# Patient Record
Sex: FEMALE | Race: WHITE | Employment: OTHER | URBAN - METROPOLITAN AREA
[De-identification: names, ages, dates, MRNs, and addresses within clinical notes are randomized per-mention and may not be internally consistent; named-entity substitution may affect disease eponyms.]

---

## 2017-11-14 ENCOUNTER — HOSPITAL ENCOUNTER (OUTPATIENT)
Dept: CT IMAGING | Age: 27
Discharge: HOME | End: 2017-11-14
Attending: FAMILY MEDICINE
Payer: COMMERCIAL

## 2017-11-14 DIAGNOSIS — R10.9 ABDOMINAL PAIN: ICD-10-CM

## 2017-11-14 PROCEDURE — 74177 CT ABD & PELVIS W/CONTRAST: CPT

## 2017-11-14 PROCEDURE — 74011636320 HC RX REV CODE- 636/320: Performed by: FAMILY MEDICINE

## 2017-11-14 PROCEDURE — 74011000258 HC RX REV CODE- 258: Performed by: FAMILY MEDICINE

## 2017-11-14 RX ORDER — SODIUM CHLORIDE 0.9 % (FLUSH) 0.9 %
10 SYRINGE (ML) INJECTION
Status: COMPLETED | OUTPATIENT
Start: 2017-11-14 | End: 2017-11-14

## 2017-11-14 RX ADMIN — SODIUM CHLORIDE 100 ML: 900 INJECTION, SOLUTION INTRAVENOUS at 11:47

## 2017-11-14 RX ADMIN — Medication 10 ML: at 11:47

## 2017-11-14 RX ADMIN — IOHEXOL 50 ML: 240 INJECTION, SOLUTION INTRATHECAL; INTRAVASCULAR; INTRAVENOUS; ORAL at 11:48

## 2017-11-14 RX ADMIN — IOPAMIDOL 100 ML: 755 INJECTION, SOLUTION INTRAVENOUS at 11:47

## 2021-01-21 ENCOUNTER — HOSPITAL ENCOUNTER (EMERGENCY)
Age: 31
Discharge: HOME | End: 2021-01-21
Attending: EMERGENCY MEDICINE
Payer: COMMERCIAL

## 2021-01-21 ENCOUNTER — OFFICE VISIT (OUTPATIENT)
Dept: CARDIOLOGY CLINIC | Age: 31
End: 2021-01-21
Payer: COMMERCIAL

## 2021-01-21 ENCOUNTER — APPOINTMENT (OUTPATIENT)
Dept: CT IMAGING | Age: 31
End: 2021-01-21
Attending: EMERGENCY MEDICINE
Payer: COMMERCIAL

## 2021-01-21 ENCOUNTER — CLINICAL SUPPORT (OUTPATIENT)
Dept: CARDIOLOGY CLINIC | Age: 31
End: 2021-01-21
Payer: COMMERCIAL

## 2021-01-21 VITALS
BODY MASS INDEX: 36.64 KG/M2 | WEIGHT: 228 LBS | RESPIRATION RATE: 18 BRPM | HEIGHT: 66 IN | DIASTOLIC BLOOD PRESSURE: 84 MMHG | HEART RATE: 95 BPM | SYSTOLIC BLOOD PRESSURE: 118 MMHG | OXYGEN SATURATION: 98 %

## 2021-01-21 VITALS
SYSTOLIC BLOOD PRESSURE: 118 MMHG | RESPIRATION RATE: 16 BRPM | TEMPERATURE: 98 F | OXYGEN SATURATION: 100 % | DIASTOLIC BLOOD PRESSURE: 78 MMHG | HEART RATE: 86 BPM

## 2021-01-21 VITALS
OXYGEN SATURATION: 98 % | RESPIRATION RATE: 18 BRPM | TEMPERATURE: 97.7 F | DIASTOLIC BLOOD PRESSURE: 90 MMHG | SYSTOLIC BLOOD PRESSURE: 146 MMHG | HEART RATE: 90 BPM

## 2021-01-21 DIAGNOSIS — R00.2 PALPITATION: ICD-10-CM

## 2021-01-21 DIAGNOSIS — R00.2 PALPITATIONS: Primary | ICD-10-CM

## 2021-01-21 DIAGNOSIS — R25.3 TWITCHING: ICD-10-CM

## 2021-01-21 DIAGNOSIS — F41.1 ANXIETY STATE: Primary | ICD-10-CM

## 2021-01-21 DIAGNOSIS — R13.19 OTHER DYSPHAGIA: ICD-10-CM

## 2021-01-21 DIAGNOSIS — R00.2 PALPITATION: Primary | ICD-10-CM

## 2021-01-21 LAB
ALBUMIN SERPL-MCNC: 4 G/DL (ref 3.5–5)
ALBUMIN/GLOB SERPL: 1 {RATIO} (ref 1.1–2.2)
ALP SERPL-CCNC: 87 U/L (ref 45–117)
ALT SERPL-CCNC: 23 U/L (ref 12–78)
ANION GAP SERPL CALC-SCNC: 9 MMOL/L (ref 5–15)
AST SERPL-CCNC: 10 U/L (ref 15–37)
BASOPHILS # BLD: 0.1 K/UL (ref 0–0.1)
BASOPHILS NFR BLD: 1 % (ref 0–1)
BILIRUB SERPL-MCNC: 1.1 MG/DL (ref 0.2–1)
BUN SERPL-MCNC: 6 MG/DL (ref 6–20)
BUN/CREAT SERPL: 7 (ref 12–20)
CALCIUM SERPL-MCNC: 9.7 MG/DL (ref 8.5–10.1)
CHLORIDE SERPL-SCNC: 105 MMOL/L (ref 97–108)
CO2 SERPL-SCNC: 23 MMOL/L (ref 21–32)
COMMENT, HOLDF: NORMAL
CREAT SERPL-MCNC: 0.84 MG/DL (ref 0.55–1.02)
DIFFERENTIAL METHOD BLD: NORMAL
EOSINOPHIL # BLD: 0.1 K/UL (ref 0–0.4)
EOSINOPHIL NFR BLD: 1 % (ref 0–7)
ERYTHROCYTE [DISTWIDTH] IN BLOOD BY AUTOMATED COUNT: 11.9 % (ref 11.5–14.5)
GLOBULIN SER CALC-MCNC: 3.9 G/DL (ref 2–4)
GLUCOSE SERPL-MCNC: 104 MG/DL (ref 65–100)
HCG UR QL: NEGATIVE
HCT VFR BLD AUTO: 42.6 % (ref 35–47)
HGB BLD-MCNC: 14.7 G/DL (ref 11.5–16)
IMM GRANULOCYTES # BLD AUTO: 0 K/UL (ref 0–0.04)
IMM GRANULOCYTES NFR BLD AUTO: 0 % (ref 0–0.5)
LYMPHOCYTES # BLD: 1.7 K/UL (ref 0.8–3.5)
LYMPHOCYTES NFR BLD: 17 % (ref 12–49)
MAGNESIUM SERPL-MCNC: 2.2 MG/DL (ref 1.6–2.4)
MCH RBC QN AUTO: 30.9 PG (ref 26–34)
MCHC RBC AUTO-ENTMCNC: 34.5 G/DL (ref 30–36.5)
MCV RBC AUTO: 89.5 FL (ref 80–99)
MONOCYTES # BLD: 0.6 K/UL (ref 0–1)
MONOCYTES NFR BLD: 6 % (ref 5–13)
NEUTS SEG # BLD: 7.5 K/UL (ref 1.8–8)
NEUTS SEG NFR BLD: 75 % (ref 32–75)
NRBC # BLD: 0 K/UL (ref 0–0.01)
NRBC BLD-RTO: 0 PER 100 WBC
PHOSPHATE SERPL-MCNC: 2.5 MG/DL (ref 2.6–4.7)
PLATELET # BLD AUTO: 322 K/UL (ref 150–400)
PMV BLD AUTO: 9.5 FL (ref 8.9–12.9)
POTASSIUM SERPL-SCNC: 3.6 MMOL/L (ref 3.5–5.1)
PROT SERPL-MCNC: 7.9 G/DL (ref 6.4–8.2)
RBC # BLD AUTO: 4.76 M/UL (ref 3.8–5.2)
SAMPLES BEING HELD,HOLD: NORMAL
SODIUM SERPL-SCNC: 137 MMOL/L (ref 136–145)
WBC # BLD AUTO: 10 K/UL (ref 3.6–11)

## 2021-01-21 PROCEDURE — 90791 PSYCH DIAGNOSTIC EVALUATION: CPT

## 2021-01-21 PROCEDURE — 93005 ELECTROCARDIOGRAM TRACING: CPT

## 2021-01-21 PROCEDURE — 99284 EMERGENCY DEPT VISIT MOD MDM: CPT

## 2021-01-21 PROCEDURE — 84100 ASSAY OF PHOSPHORUS: CPT

## 2021-01-21 PROCEDURE — 80053 COMPREHEN METABOLIC PANEL: CPT

## 2021-01-21 PROCEDURE — 85025 COMPLETE CBC W/AUTO DIFF WBC: CPT

## 2021-01-21 PROCEDURE — 36415 COLL VENOUS BLD VENIPUNCTURE: CPT

## 2021-01-21 PROCEDURE — 81025 URINE PREGNANCY TEST: CPT

## 2021-01-21 PROCEDURE — 70450 CT HEAD/BRAIN W/O DYE: CPT

## 2021-01-21 PROCEDURE — 93227 XTRNL ECG REC<48 HR R&I: CPT | Performed by: INTERNAL MEDICINE

## 2021-01-21 PROCEDURE — 99204 OFFICE O/P NEW MOD 45 MIN: CPT | Performed by: SPECIALIST

## 2021-01-21 PROCEDURE — 83735 ASSAY OF MAGNESIUM: CPT

## 2021-01-21 PROCEDURE — 93225 XTRNL ECG REC<48 HRS REC: CPT | Performed by: SPECIALIST

## 2021-01-21 PROCEDURE — 96372 THER/PROPH/DIAG INJ SC/IM: CPT

## 2021-01-21 PROCEDURE — 74011250636 HC RX REV CODE- 250/636: Performed by: NURSE PRACTITIONER

## 2021-01-21 RX ORDER — HYDROXYZINE 50 MG/1
50 TABLET, FILM COATED ORAL
Qty: 30 TAB | Refills: 0 | Status: SHIPPED | OUTPATIENT
Start: 2021-01-21 | End: 2021-01-31

## 2021-01-21 RX ORDER — HYDROXYZINE 50 MG/ML
50 INJECTION, SOLUTION INTRAMUSCULAR
Status: COMPLETED | OUTPATIENT
Start: 2021-01-21 | End: 2021-01-21

## 2021-01-21 RX ADMIN — HYDROXYZINE HYDROCHLORIDE 50 MG: 50 INJECTION, SOLUTION INTRAMUSCULAR at 18:18

## 2021-01-21 NOTE — ED PROVIDER NOTES
HPI       Healthy 31y F here with muscle spasms/twitching all over. Started about 8 days ago. No injury or trauma. No hx of similar. Says the feeling like like \"just before getting a tavares horse. \" No fever. No rash or skin changes. No vomiting or diarrhea. [facility] Given 2mg valium and this made her sleepy. Also made her feel nauseated. Also reports heart feels like it is racing with some of these episodes. No shortness of breath. Nothing makes sx's better or worse. Also reports trouble swallowing and having endoscopy soon. Has tried to make all of her diet liquid but reports decreased caloric intake. Also stopped drinking etoh 10 days ago. Past Medical History:   Diagnosis Date    Diabetes mellitus     gestational diabetes--diet controlled, fasting and 1hr after meals    Psychiatric problem     anxiety--does not take medications    Rhesus isoimmunization unspecified as to episode of care in pregnancy     received rhogam shot       Past Surgical History:   Procedure Laterality Date    HX OTHER SURGICAL      T&A at age 1         History reviewed. No pertinent family history.     Social History     Socioeconomic History    Marital status:      Spouse name: Not on file    Number of children: Not on file    Years of education: Not on file    Highest education level: Not on file   Occupational History    Not on file   Social Needs    Financial resource strain: Not on file    Food insecurity     Worry: Not on file     Inability: Not on file    Transportation needs     Medical: Not on file     Non-medical: Not on file   Tobacco Use    Smoking status: Former Smoker     Packs/day: 0.25     Years: 4.00     Pack years: 1.00     Quit date: 8/15/2010     Years since quitting: 10.4    Smokeless tobacco: Never Used   Substance and Sexual Activity    Alcohol use: Not Currently     Comment: quit ten days ago     Drug use: No    Sexual activity: Yes     Partners: Male Lifestyle    Physical activity     Days per week: Not on file     Minutes per session: Not on file    Stress: Not on file   Relationships    Social connections     Talks on phone: Not on file     Gets together: Not on file     Attends Yazidi service: Not on file     Active member of club or organization: Not on file     Attends meetings of clubs or organizations: Not on file     Relationship status: Not on file    Intimate partner violence     Fear of current or ex partner: Not on file     Emotionally abused: Not on file     Physically abused: Not on file     Forced sexual activity: Not on file   Other Topics Concern    Not on file   Social History Narrative    Not on file         ALLERGIES: Azithromycin and Erythromycin    Review of Systems   Review of Systems   Constitutional: (-) weight loss. HEENT: (-) stiff neck   Eyes: (-) discharge. Respiratory: (-) cough. Cardiovascular: (-) syncope. Gastrointestinal: (-) blood in stool. Genitourinary: (-) hematuria. Musculoskeletal: (-) myalgias. Neurological: (-) seizure. Skin: (-) petechiae  Lymph/Immunologic: (-) enlarged lymph nodes  All other systems reviewed and are negative. Vitals:    01/21/21 0716   BP: (!) 146/90   Pulse: 90   Resp: 18   Temp: 97.7 °F (36.5 °C)   SpO2: 98%            Physical Exam Nursing note and vitals reviewed. Constitutional: oriented to person, place, and time. appears well-developed and well-nourished. No distress. Head: Normocephalic and atraumatic. Sclera anicteric  Nose: No rhinorrhea  Mouth/Throat: Oropharynx is clear and moist. Pharynx normal  Eyes: Conjunctivae are normal. Pupils are equal, round, and reactive to light. Right eye exhibits no discharge. Left eye exhibits no discharge. Neck: Painless normal range of motion. Neck supple. No LAD. Cardiovascular: Normal rate, regular rhythm, normal heart sounds and intact distal pulses. Exam reveals no gallop and no friction rub.   No murmur heard.  Pulmonary/Chest:  No respiratory distress. No wheezes. No rales. No rhonchi. No increased work of breathing. No accessory muscle use. Good air exchange throughout. Abdominal: soft, non-tender, no rebound or guarding. No hepatosplenomegaly. Normal bowel sounds throughout. Back: no tenderness to palpation, no deformities, no CVA tenderness  Extremities/Musculoskeletal: Normal range of motion. no tenderness. No edema. Distal extremities are neurovasc intact. Lymphadenopathy:   No adenopathy. Neurological:  CN II-XII intact, 5/5 strength throughout, nl sensation throughout, nl cerebellar function, nl speech/fluency, nl gait/station, no pronator drift. Normal mental status. Skin: Skin is warm and dry. No rash noted. No pallor. MDM 31y F here with muscle twitching and heart palpitations. Ongoing for about a week. Reassuring exam. Labs, ECG, and CT head. Procedures      ED EKG interpretation:  Rhythm: normal sinus rhythm; and regular . Rate (approx.): 91; Axis: normal; P wave: normal; QRS interval: normal ; ST/T wave: normal;  This EKG was interpreted by Syd Robles MD,ED Provider. 8:51 AM  Workup in the ED does not reveal any abnormalities. Pt aware of results and need to follow-up with neurology and cardiology for further evaluation of her sx's. Advised to return if she feels worse in any way.

## 2021-01-21 NOTE — ED TRIAGE NOTES
Triage:  Pt returned to ED due to concerns over intermittent periods of palpitations, and near syncopal moments. Pt arrives with holter monitor in place.

## 2021-01-21 NOTE — ED PROVIDER NOTES
This is a 33-year-old female with past medical history of anxiety and gestational diabetes who presents ER today for her third evaluation of episodic palpitations, impending sense of doom, anxiety, and sensation of dyspnea. According the patient, her symptoms started approximately 10 days ago which follows line with when she stopped using alcohol (was a former every day drinker). Since then, she reports increased anxiety and experiencing paroxysmal episodes of the above symptoms with no clear inciting event. She denies any history of discrete panic attacks, but states that she has had bad anxiety in the past.  She was seen and evaluated in this ER earlier today with a negative cardiac work-up and further cardiology, who implanted a loop recorder earlier today. She presents now in tears because she feels like she is going to die. ROS negative for: Family history of sudden cardiac death or premature heart disease, syncope, seizures, sensorimotor disturbances, vomiting, cough, fever/chills. Past Medical History:   Diagnosis Date    Diabetes mellitus     gestational diabetes--diet controlled, fasting and 1hr after meals    Psychiatric problem     anxiety--does not take medications    Rhesus isoimmunization unspecified as to episode of care in pregnancy     received rhogam shot       Past Surgical History:   Procedure Laterality Date    HX OTHER SURGICAL      T&A at age 1         History reviewed. No pertinent family history.     Social History     Socioeconomic History    Marital status:      Spouse name: Not on file    Number of children: Not on file    Years of education: Not on file    Highest education level: Not on file   Occupational History    Not on file   Social Needs    Financial resource strain: Not on file    Food insecurity     Worry: Not on file     Inability: Not on file    Transportation needs     Medical: Not on file     Non-medical: Not on file   Tobacco Use    Smoking status: Former Smoker     Packs/day: 0.25     Years: 4.00     Pack years: 1.00     Quit date: 8/15/2010     Years since quitting: 10.4    Smokeless tobacco: Never Used   Substance and Sexual Activity    Alcohol use: Not Currently     Comment: quit ten days ago     Drug use: No    Sexual activity: Yes     Partners: Male   Lifestyle    Physical activity     Days per week: Not on file     Minutes per session: Not on file    Stress: Not on file   Relationships    Social connections     Talks on phone: Not on file     Gets together: Not on file     Attends Sikh service: Not on file     Active member of club or organization: Not on file     Attends meetings of clubs or organizations: Not on file     Relationship status: Not on file    Intimate partner violence     Fear of current or ex partner: Not on file     Emotionally abused: Not on file     Physically abused: Not on file     Forced sexual activity: Not on file   Other Topics Concern    Not on file   Social History Narrative    Not on file         ALLERGIES: Azithromycin and Erythromycin    Review of Systems   Constitutional: Negative for fever. HENT: Negative for sore throat. Eyes: Negative for visual disturbance. Respiratory: Negative for shortness of breath. Cardiovascular: Positive for palpitations. Gastrointestinal: Negative for vomiting. Genitourinary: Negative for dysuria. Musculoskeletal: Negative for myalgias. Skin: Negative for rash. Neurological: Negative for dizziness. Psychiatric/Behavioral: Positive for dysphoric mood and sleep disturbance. Negative for self-injury and suicidal ideas. The patient is nervous/anxious. Vitals:    01/21/21 1701   BP: (!) 132/90   Pulse: 83   Resp: 18   Temp: 97.6 °F (36.4 °C)   SpO2: 99%            Physical Exam  Vitals signs and nursing note reviewed. Constitutional:       General: She is not in acute distress. Appearance: Normal appearance. She is not ill-appearing. HENT:      Head: Normocephalic and atraumatic. Right Ear: External ear normal.      Left Ear: External ear normal.      Nose: Nose normal.      Mouth/Throat:      Mouth: Mucous membranes are moist.      Pharynx: Oropharynx is clear. Eyes:      General: No scleral icterus. Extraocular Movements: Extraocular movements intact. Conjunctiva/sclera: Conjunctivae normal.      Pupils: Pupils are equal, round, and reactive to light. Neck:      Musculoskeletal: Normal range of motion and neck supple. No neck rigidity or muscular tenderness. Cardiovascular:      Rate and Rhythm: Normal rate and regular rhythm. Pulses: Normal pulses. Heart sounds: Normal heart sounds. Pulmonary:      Effort: Pulmonary effort is normal.      Breath sounds: Normal breath sounds. Abdominal:      General: Abdomen is flat. Bowel sounds are normal.      Palpations: Abdomen is soft. Musculoskeletal: Normal range of motion. Skin:     General: Skin is warm and dry. Coloration: Skin is not pale. Neurological:      General: No focal deficit present. Mental Status: She is alert and oriented to person, place, and time. Psychiatric:         Mood and Affect: Mood is anxious. Behavior: Behavior normal.         Thought Content: Thought content normal.         Judgment: Judgment normal.          MDM      VITAL SIGNS:  Patient Vitals for the past 4 hrs:   Temp Pulse Resp BP SpO2   01/21/21 1916 98 °F (36.7 °C) 86 16 118/78 100 %   01/21/21 1701 97.6 °F (36.4 °C) 83 18 (!) 132/90 99 %         LABS:  No results found for this or any previous visit (from the past 6 hour(s)). IMAGING:  No orders to display         Medications During Visit:  Medications   hydrOXYzine (VISTARIL) injection 50 mg (50 mg IntraMUSCular Given 1/21/21 1818)         DECISION MAKING:  Flavia Jacques is a 26 y.o. female who comes in as above. Chart reviewed from ER visit earlier today.   No new testing required due to ongoing symptoms that have not changed/progressed. Patient saw Dr. Jacinto Jansen earlier today and is currently wearing loop recorder. To me, symptoms were consistent with an anxiety disorder with panic attack features. Patient reports feeling much more calm and relaxed after one-time dose of hydroxyzine. BSMART was able to talk the patient provide outpatient CBT and counseling follow-up. Patient started take hydroxyzine as needed and to follow-up with her outpatient care team as discussed. Above results discussed and reviewed with the patient. Patient verbalized understanding of the care plan, including any changes to current outpatient medication regimen, discussed disease process, symptom control, and follow-up care. IMPRESSION:  1. Anxiety state        DISPOSITION:  Discharged      Current Discharge Medication List      START taking these medications    Details   hydrOXYzine HCL (ATARAX) 50 mg tablet Take 1 Tab by mouth every six (6) hours as needed for Anxiety for up to 10 days. Qty: 30 Tab, Refills: 0              Follow-up Information     Follow up With Specialties Details Why Contact Info    [facility] [place]  Call in 1 day to establish psychotherapy and medication management for panic disorder [phone]            The patient is asked to follow-up with their primary care provider in the next several days. They are to call tomorrow for an appointment. The patient is asked to return promptly for any increased concerns or worsening of symptoms. They can return to this emergency department or any other emergency department.

## 2021-01-21 NOTE — ED TRIAGE NOTES
Patient arrives via EMS from home with CC of muscle twitching and pain. Patient was seen at [facility] last night and was given Valium and was told to follow up with Neurology. Pt reports she was still twitching and had pain throughout the night, worse in the left shoulder and leg. Patient states she began to feel like her chest was pounding in the ambulance. Per EMS patient had an irregular rhythm.  +nausea. Also reports she quit drinking 10 days ago and has had poor PO intake since.

## 2021-01-21 NOTE — PROGRESS NOTES
HISTORY OF PRESENT ILLNESS  Parvin Levi is a 26 y.o. female. She is referred for evaluation of palpitations. They have been going on for quite some time but are worse now and she is also having a racing heart. The racing heart appeared about 4 months ago. She is under a great deal of stress staying at home as a mother caring for her children. She was drinking 1 bottle of wine per night but stopped about 10 days ago. She is also had trouble swallowing for the last 10 days and is having to use a liquid or soft diet. She had gained 80 pounds over the past year but now has lost 15 pounds. She has an appointment with a gastroenterologist for upper endoscopy. She does not smoke cigarettes or drink excessive caffeine. HPI  Patient Active Problem List   Diagnosis Code    Delivery normal O80    Gestational diabetes O24.419     Current Outpatient Medications   Medication Sig Dispense Refill    diazepam (VALIUM) 5 mg tablet Take 1 Tab by mouth every eight (8) hours as needed for Anxiety (do not drive while taking this medication). 10 Tab 0    traMADol (ULTRAM) 50 mg tablet Take 1 Tab by mouth every six (6) hours as needed for Pain. 12 Tab 0     Past Medical History:   Diagnosis Date    Diabetes mellitus     gestational diabetes--diet controlled, fasting and 1hr after meals    Psychiatric problem     anxiety--does not take medications    Rhesus isoimmunization unspecified as to episode of care in pregnancy     received rhogam shot     Past Surgical History:   Procedure Laterality Date    HX OTHER SURGICAL      T&A at age 1       Review of Systems   Constitutional: Positive for weight loss. Cardiovascular: Positive for palpitations. Gastrointestinal: Positive for nausea. Psychiatric/Behavioral: The patient is nervous/anxious. All other systems reviewed and are negative.     Visit Vitals  /84 (BP 1 Location: Left arm, BP Patient Position: Sitting)   Pulse 95   Resp 18   Ht 5' 6\" (1.676 m)   Wt 228 lb (103.4 kg)   SpO2 98%   BMI 36.80 kg/m²       Physical Exam   Constitutional: She is oriented to person, place, and time. She appears well-nourished. HENT:   Head: Atraumatic. Eyes: Conjunctivae are normal.   Neck: Neck supple. Cardiovascular: Normal rate, regular rhythm and normal heart sounds. Exam reveals no gallop and no friction rub. No murmur heard. Pulmonary/Chest: Breath sounds normal. She has no wheezes. Abdominal: Bowel sounds are normal.   Musculoskeletal:         General: No edema. Neurological: She is oriented to person, place, and time. Skin: Skin is dry. Psychiatric: Her behavior is normal.   Nursing note and vitals reviewed. ASSESSMENT and PLAN  It is unclear if she is having a primary arrhythmia or is having extreme anxiety and then sinus tachycardia. Her exam and EKG are normal.  I will order a 48-hour Holter monitor and have her return for an echocardiogram and see her afterwards. Contraindicated

## 2021-01-21 NOTE — PROGRESS NOTES
Andreas Montgomery is a 26 y.o. female  Chief Complaint   Patient presents with   [facility] Follow Up     From [address] [facility] [address] Maintenance Due   Topic Date Due    COVID-19 Vaccine (1 of 2) 05/16/2004    PAP AKA CERVICAL CYTOLOGY  05/16/2009    Flu Vaccine (1) 09/01/2020     Visit Vitals  /84 (BP 1 Location: Left arm, BP Patient Position: Sitting)   Pulse 95   Resp 18   Ht 5' 6\" (1.676 m)   Wt 228 lb (103.4 kg)   SpO2 98%   BMI 36.80 kg/m²

## 2021-01-22 LAB
ATRIAL RATE: 91 BPM
CALCULATED P AXIS, ECG09: 62 DEGREES
CALCULATED R AXIS, ECG10: 37 DEGREES
CALCULATED T AXIS, ECG11: 49 DEGREES
DIAGNOSIS, 93000: NORMAL
P-R INTERVAL, ECG05: 114 MS
Q-T INTERVAL, ECG07: 354 MS
QRS DURATION, ECG06: 88 MS
QTC CALCULATION (BEZET), ECG08: 435 MS
VENTRICULAR RATE, ECG03: 91 BPM

## 2021-01-22 NOTE — DISCHARGE INSTRUCTIONS

## 2021-01-22 NOTE — BSMART NOTE
The writer was asked by RITIKA Whalen to speak with the patient about outpatient resources to help manage panic attacks. The option of same-day access with [facility] as well as private practice was discussed. The patient plans to seek care with [facility], which offers relatively quick access to both psychotherapy and medication management. Once she is acutely stabilized here in the ED, she will be discharged with plan to follow up online at [facility] or by calling [phone].

## 2021-01-27 ENCOUNTER — TELEPHONE (OUTPATIENT)
Dept: CARDIOLOGY CLINIC | Age: 31
End: 2021-01-27

## 2021-01-27 NOTE — TELEPHONE ENCOUNTER
Dr. Davenport-  Patient's holter monitor results are on your desk for review. She is scheduled for an echo and to see you in office next Thursday 2/4.    Future Appointments   Date Time Provider Department Center   2/4/2021  3:00 PM [facility], [facility]   2/4/2021  3:40 PM Faustino Davenport MD [facility]   2/11/2021  8:00 AM Mary Phillips MD [facility]

## 2021-01-28 NOTE — TELEPHONE ENCOUNTER
Called patient. Verified patient's identity with two identifiers. Notified patient of results and Dr. Ana Myers message. She will keep appointments for testing and to see Dr. Peder Holter next week. Patient verbalized understanding and denied further questions or concerns.

## 2021-01-28 NOTE — TELEPHONE ENCOUNTER
MD Kortney Smith, RN   Caller: Unspecified (Yesterday,  2:42 PM)             Occasional premature beats, nothing concerning

## 2023-05-14 RX ORDER — DIAZEPAM 5 MG/1
TABLET ORAL EVERY 8 HOURS PRN
COMMUNITY
Start: 2014-05-05

## 2023-05-14 RX ORDER — TRAMADOL HYDROCHLORIDE 50 MG/1
TABLET ORAL EVERY 6 HOURS PRN
COMMUNITY
Start: 2014-05-05

## 2024-09-06 ENCOUNTER — TELEPHONE (OUTPATIENT)
Dept: PRIMARY CARE CLINIC | Facility: CLINIC | Age: 34
End: 2024-09-06

## 2024-09-06 NOTE — TELEPHONE ENCOUNTER
----- Message from Sunday GUTIERREZ sent at 9/6/2024  8:41 AM EDT -----  Regarding: [facility] Appointment Request  [facility] Appointment Request    Patient needs appointment for [facility] Appointment Type: New Patient.    Patient Requested Dates(s):Any date  Patient Requested Time:Afternoon  Provider Name:Lanie Brizuela DO    Reason for Appointment Request: New Patient - Requested Provider unavailable  --------------------------------------------------------------------------------------------------------------------------    Relationship to Patient: Self     Call Back Information: OK to leave message on voicemail  Preferred Call Back Number: Phone [phone]

## 2025-07-16 ENCOUNTER — HOSPITAL ENCOUNTER (EMERGENCY)
Facility: HOSPITAL | Age: 35
Discharge: HOME | End: 2025-07-16
Attending: EMERGENCY MEDICINE
Payer: COMMERCIAL

## 2025-07-16 ENCOUNTER — APPOINTMENT (OUTPATIENT)
Facility: HOSPITAL | Age: 35
End: 2025-07-16
Payer: COMMERCIAL

## 2025-07-16 VITALS
DIASTOLIC BLOOD PRESSURE: 78 MMHG | HEART RATE: 79 BPM | SYSTOLIC BLOOD PRESSURE: 158 MMHG | TEMPERATURE: 97.7 F | RESPIRATION RATE: 16 BRPM | OXYGEN SATURATION: 100 %

## 2025-07-16 DIAGNOSIS — S61.215A LACERATION OF LEFT RING FINGER WITHOUT FOREIGN BODY WITHOUT DAMAGE TO NAIL, INITIAL ENCOUNTER: Primary | ICD-10-CM

## 2025-07-16 PROCEDURE — 6360000002 HC RX W HCPCS: Performed by: STUDENT IN AN ORGANIZED HEALTH CARE EDUCATION/TRAINING PROGRAM

## 2025-07-16 PROCEDURE — 12001 RPR S/N/AX/GEN/TRNK 2.5CM/<: CPT

## 2025-07-16 PROCEDURE — 90714 TD VACC NO PRESV 7 YRS+ IM: CPT | Performed by: STUDENT IN AN ORGANIZED HEALTH CARE EDUCATION/TRAINING PROGRAM

## 2025-07-16 PROCEDURE — 73140 X-RAY EXAM OF FINGER(S): CPT

## 2025-07-16 PROCEDURE — 90471 IMMUNIZATION ADMIN: CPT | Performed by: STUDENT IN AN ORGANIZED HEALTH CARE EDUCATION/TRAINING PROGRAM

## 2025-07-16 PROCEDURE — 99284 EMERGENCY DEPT VISIT MOD MDM: CPT

## 2025-07-16 RX ORDER — LIDOCAINE HYDROCHLORIDE 10 MG/ML
5 INJECTION, SOLUTION EPIDURAL; INFILTRATION; INTRACAUDAL; PERINEURAL
Status: COMPLETED | OUTPATIENT
Start: 2025-07-16 | End: 2025-07-16

## 2025-07-16 RX ADMIN — LIDOCAINE HYDROCHLORIDE 5 ML: 10 INJECTION, SOLUTION EPIDURAL; INFILTRATION; INTRACAUDAL; PERINEURAL at 12:25

## 2025-07-16 RX ADMIN — CLOSTRIDIUM TETANI TOXOID ANTIGEN (FORMALDEHYDE INACTIVATED) AND CORYNEBACTERIUM DIPHTHERIAE TOXOID ANTIGEN (FORMALDEHYDE INACTIVATED) 0.5 ML: 5; 2 INJECTION, SUSPENSION INTRAMUSCULAR at 12:33

## 2025-07-16 ASSESSMENT — PAIN - FUNCTIONAL ASSESSMENT
PAIN_FUNCTIONAL_ASSESSMENT: 0-10
PAIN_FUNCTIONAL_ASSESSMENT: ACTIVITIES ARE NOT PREVENTED

## 2025-07-16 ASSESSMENT — PAIN DESCRIPTION - PAIN TYPE: TYPE: ACUTE PAIN

## 2025-07-16 ASSESSMENT — ENCOUNTER SYMPTOMS
VOMITING: 0
EYE PAIN: 0
DIARRHEA: 0
SHORTNESS OF BREATH: 0
ABDOMINAL PAIN: 0
SORE THROAT: 0
NAUSEA: 0
COUGH: 0

## 2025-07-16 ASSESSMENT — PAIN DESCRIPTION - LOCATION: LOCATION: FINGER (COMMENT WHICH ONE)

## 2025-07-16 ASSESSMENT — PAIN DESCRIPTION - ONSET: ONSET: ON-GOING

## 2025-07-16 ASSESSMENT — PAIN DESCRIPTION - ORIENTATION: ORIENTATION: LEFT

## 2025-07-16 ASSESSMENT — PAIN DESCRIPTION - DESCRIPTORS: DESCRIPTORS: ACHING

## 2025-07-16 ASSESSMENT — PAIN SCALES - GENERAL: PAINLEVEL_OUTOF10: 6

## 2025-07-16 ASSESSMENT — PAIN DESCRIPTION - FREQUENCY: FREQUENCY: CONTINUOUS

## 2025-07-16 NOTE — ED TRIAGE NOTES
Patient arrives ambulatory with complaints of finger laceration last night around midnight. Reports shutting finger between 2 doors.

## 2025-07-16 NOTE — ED PROVIDER NOTES
[facility] EMERGENCY DEPARTMENT  EMERGENCY DEPARTMENT ENCOUNTER      Pt Name: Anila Arreguin  MRN: [id number]  Birthdate 1990  Date of evaluation: 7/16/2025  Provider: BURT PITT    CHIEF COMPLAINT       Chief Complaint   Patient presents with    Finger Laceration         HISTORY OF PRESENT ILLNESS   (Location/Symptom, Timing/Onset, Context/Setting, Quality, Duration, Modifying Factors, Severity)  Note limiting factors.   35 y.o. female presents to ED with laceration to finger. Patient reports that around midnight last night she got her L 4th finger smashed in between two doors. She rinsed it out and bandaged it but this morning it was still bleeding so decided to come in. Notes tetanus is not UTD.             Review of External Medical Records:     Nursing Notes were reviewed.    REVIEW OF SYSTEMS    (2-9 systems for level 4, 10 or more for level 5)     Review of Systems   Constitutional:  Negative for chills and fever.   HENT:  Negative for congestion, ear pain and sore throat.    Eyes:  Negative for pain.   Respiratory:  Negative for cough and shortness of breath.    Cardiovascular:  Negative for chest pain.   Gastrointestinal:  Negative for abdominal pain, diarrhea, nausea and vomiting.   Genitourinary:  Negative for dysuria and flank pain.   Musculoskeletal:  Negative for myalgias.   Skin:  Positive for wound. Negative for rash.   Neurological:  Negative for dizziness and headaches.   Hematological:  Negative for adenopathy.       Except as noted above the remainder of the review of systems was reviewed and negative.       PAST MEDICAL HISTORY     Past Medical History:   Diagnosis Date    Diabetes mellitus (HCC)     gestational diabetes--diet controlled, fasting and 1hr after meals    Psychiatric problem     anxiety--does not take medications    Rhesus isoimmunization unspecified as to episode of care in pregnancy     received rhogam shot         SURGICAL HISTORY       Past Surgical History: